# Patient Record
Sex: FEMALE | URBAN - NONMETROPOLITAN AREA
[De-identification: names, ages, dates, MRNs, and addresses within clinical notes are randomized per-mention and may not be internally consistent; named-entity substitution may affect disease eponyms.]

---

## 2022-08-19 ENCOUNTER — OFFICE VISIT (OUTPATIENT)
Dept: URBAN - NONMETROPOLITAN AREA CLINIC 8 | Facility: CLINIC | Age: 67
End: 2022-08-19
Payer: COMMERCIAL

## 2022-08-19 DIAGNOSIS — H16.223 KERATOCONJUNCTIVITIS SICCA, BILATERAL: Primary | ICD-10-CM

## 2022-08-19 DIAGNOSIS — H10.45 OTHER CHRONIC ALLERGIC CONJUNCTIVITIS: ICD-10-CM

## 2022-08-19 DIAGNOSIS — H25.13 AGE-RELATED NUCLEAR CATARACT, BILATERAL: ICD-10-CM

## 2022-08-19 PROCEDURE — 92004 COMPRE OPH EXAM NEW PT 1/>: CPT | Performed by: OPTOMETRIST

## 2022-08-19 RX ORDER — PREDNISOLONE ACETATE 10 MG/ML
1 % SUSPENSION/ DROPS OPHTHALMIC
Qty: 5 | Refills: 0 | Status: INACTIVE
Start: 2022-08-19 | End: 2022-08-28

## 2022-08-19 RX ORDER — AZELASTINE HYDROCHLORIDE 0.5 MG/ML
0.05 % SOLUTION/ DROPS OPHTHALMIC
Qty: 5 | Refills: 11 | Status: ACTIVE
Start: 2022-08-19

## 2022-08-19 ASSESSMENT — KERATOMETRY
OS: 42.63
OD: 42.50

## 2022-08-19 ASSESSMENT — INTRAOCULAR PRESSURE
OD: 16
OS: 16

## 2022-08-19 NOTE — IMPRESSION/PLAN
Impression: Keratoconjunctivitis sicca, bilateral: C17.869. Plan: Discussed diagnosis with patient. Recommend OTC artificial tears in OU for comfort, 3-4x's a day.